# Patient Record
Sex: FEMALE | Race: AMERICAN INDIAN OR ALASKA NATIVE
[De-identification: names, ages, dates, MRNs, and addresses within clinical notes are randomized per-mention and may not be internally consistent; named-entity substitution may affect disease eponyms.]

---

## 2019-11-02 ENCOUNTER — HOSPITAL ENCOUNTER (EMERGENCY)
Dept: HOSPITAL 5 - ED | Age: 24
LOS: 1 days | Discharge: HOME | End: 2019-11-03
Payer: SELF-PAY

## 2019-11-02 DIAGNOSIS — J45.909: ICD-10-CM

## 2019-11-02 DIAGNOSIS — N34.2: Primary | ICD-10-CM

## 2019-11-02 DIAGNOSIS — Z79.899: ICD-10-CM

## 2019-11-02 LAB
BACTERIA #/AREA URNS HPF: (no result) /HPF
BILIRUB UR QL STRIP: (no result)
BLOOD UR QL VISUAL: (no result)
MUCOUS THREADS #/AREA URNS HPF: (no result) /HPF
PH UR STRIP: 6 [PH] (ref 5–7)
PROT UR STRIP-MCNC: (no result) MG/DL
RBC #/AREA URNS HPF: 2 /HPF (ref 0–6)
UROBILINOGEN UR-MCNC: 2 MG/DL (ref ?–2)
WBC #/AREA URNS HPF: 1 /HPF (ref 0–6)

## 2019-11-02 PROCEDURE — 99284 EMERGENCY DEPT VISIT MOD MDM: CPT

## 2019-11-02 PROCEDURE — 87210 SMEAR WET MOUNT SALINE/INK: CPT

## 2019-11-02 PROCEDURE — 87591 N.GONORRHOEAE DNA AMP PROB: CPT

## 2019-11-02 PROCEDURE — 81025 URINE PREGNANCY TEST: CPT

## 2019-11-02 PROCEDURE — 81001 URINALYSIS AUTO W/SCOPE: CPT

## 2019-11-02 PROCEDURE — 96372 THER/PROPH/DIAG INJ SC/IM: CPT

## 2019-11-02 NOTE — EVENT NOTE
ED Screening Note


Date of service: 19


Time: 19:55


ED Screening Note: 





This is a 24 y.o. F. that presents to the ER with vaginal discharge, pruritus, 

and dysuria for 1 week.





She tried monistat with worsening symptoms.





LMP 10/15/2019,  A1 





This initial assessment/diagnostic orders/clinical plan/treatment(s) is/are 

subject to change based on patients health status, clinical progression and re-

assessment by fellow clinical providers in the ED. Further treatment and workup 

at subsequent clinical providers discretion. Patient/guardian urged not to elope

from the ED as their condition may be serious if not clinically assessed and 

managed. 





Initial orders include: 





Pelvic exam


Labs

## 2019-11-02 NOTE — EMERGENCY DEPARTMENT REPORT
HPI





- General


Chief Complaint: Urogenital-Female


Time Seen by Provider: 11/02/19 19:55





- HPI


HPI: 





room 41








The pt is a 23 y/o F p/w a cc of vaginal irritation. The pt states she has had 

vaginal irritation and dysuria x 1 week. Pt c/o burning sensation when she 

urinates. Pt has noticed sl blood on the tissue when she wipes after urinating. 

Pt admits to white vaginal d/c. Pt denies h/o fever. Pt has used OTC vaginal 

creams for yeast but it has not helped. 





ED Past Medical Hx





- Past Medical History


Previous Medical History?: Yes


Hx Asthma: Yes





- Surgical History


Past Surgical History?: No





- Family History


Family history: no significant





- Social History


Smoking Status: Never Smoker


Substance Use Type: Marijuana





- Medications


Home Medications: 


                                Home Medications











 Medication  Instructions  Recorded  Confirmed  Last Taken  Type


 


Ciprofloxacin HCl [Ciprofloxacin 500 mg PO Q12HR #14 tab 11/03/19  Unknown Rx





TAB]     


 


Phenazopyridine [Pyridium] 200 mg PO TID #6 tab 11/03/19  Unknown Rx














ED Review of Systems


ROS: 


Stated complaint: VAG PAIN/IRRITATION


Other details as noted in HPI





Constitutional: denies: fever


Eyes: denies: eye pain


ENT: denies: throat pain


Respiratory: no symptoms reported


Cardiovascular: denies: chest pain


Endocrine: no symptoms reported


Gastrointestinal: denies: abdominal pain


Genitourinary: dysuria, discharge


Musculoskeletal: denies: back pain


Neurological: denies: headache





Physical Exam





- Physical Exam


Vital Signs: 


                                   Vital Signs











  11/02/19





  19:35


 


Temperature 98.7 F


 


Pulse Rate 87


 


Respiratory 18





Rate 


 


Blood Pressure 104/61


 


O2 Sat by Pulse 97





Oximetry 











Physical Exam: 





GEN: WD WN female lying on stretcher in NAD


HEENT: NCAT, EOMI


NECK:Trachea midline, no stridor


Pulm: no resp distress


ABD: there is no distention


Neuro: GCS 15


SKIN: no diaphoresis


MS: no evidence of acute injury


Pelvic: scant white d/c present








ED Course


                                   Vital Signs











  11/02/19





  19:35


 


Temperature 98.7 F


 


Pulse Rate 87


 


Respiratory 18





Rate 


 


Blood Pressure 104/61


 


O2 Sat by Pulse 97





Oximetry 














ED Medical Decision Making





- Lab Data





                                Laboratory Tests











  11/02/19





  21:09


 


Urine Color  Yellow


 


Urine Turbidity  Clear


 


Urine pH  6.0


 


Ur Specific Gravity  1.025


 


Urine Protein  <15 mg/dl


 


Urine Glucose (UA)  Neg


 


Urine Ketones  Neg


 


Urine Blood  Neg


 


Urine Nitrite  Neg


 


Ur Reducing Substances  Not Reportable


 


Urine Bilirubin  Neg


 


Urine Ictotest  Not Reportable


 


Urine Urobilinogen  2.0


 


Ur Leukocyte Esterase  Neg


 


Urine WBC (Auto)  1.0


 


Urine RBC (Auto)  2.0


 


U Epithel Cells (Auto)  4.0


 


Urine Bacteria (Auto)  1+


 


Urine Mucus  1+


 


Urine HCG, Qual  Negative














wet prep- no yeast, trich or clue cells





- Differential Diagnosis


uti, urethritis, bacterial vaginosis


Critical care attestation.: 


If time is entered above; I have spent that time in minutes in the direct care 

of this critically ill patient, excluding procedure time.








ED Disposition


Clinical Impression: 


 Urethritis, Vaginal discharge





Disposition: DC-01 TO HOME OR SELFCARE


Is pt being admited?: No


Does the pt Need Aspirin: No


Condition: Stable


Prescriptions: 


Ciprofloxacin HCl [Ciprofloxacin TAB] 500 mg PO Q12HR #14 tab


Phenazopyridine [Pyridium] 200 mg PO TID #6 tab


Referrals: 


[facility] [Outside] - 3-5 Days


Forms:  [facility] Treatment and Prevention


Time of Disposition: 00:35

## 2019-11-03 VITALS — DIASTOLIC BLOOD PRESSURE: 60 MMHG | SYSTOLIC BLOOD PRESSURE: 110 MMHG

## 2021-01-28 ENCOUNTER — HOSPITAL ENCOUNTER (EMERGENCY)
Dept: HOSPITAL 5 - ED | Age: 26
Discharge: HOME | End: 2021-01-28
Payer: SELF-PAY

## 2021-01-28 VITALS — SYSTOLIC BLOOD PRESSURE: 112 MMHG | DIASTOLIC BLOOD PRESSURE: 76 MMHG
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
## 2021-01-28 DIAGNOSIS — F12.10: ICD-10-CM
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
## 2021-01-28 DIAGNOSIS — M62.838: ICD-10-CM

## 2021-01-28 DIAGNOSIS — Z79.899: ICD-10-CM

## 2021-01-28 DIAGNOSIS — S39.012A: Primary | ICD-10-CM
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
## 2021-01-28 DIAGNOSIS — J45.909: ICD-10-CM
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
## 2021-01-28 LAB
BILIRUB UR QL STRIP: (no result)
BLOOD UR QL VISUAL: (no result)
MUCOUS THREADS #/AREA URNS HPF: (no result) /HPF
PH UR STRIP: 6 [PH] (ref 5–7)
PROT UR STRIP-MCNC: (no result) MG/DL
RBC #/AREA URNS HPF: 1 /HPF (ref 0–6)
UROBILINOGEN UR-MCNC: < 2 MG/DL (ref ?–2)
WBC #/AREA URNS HPF: 2 /HPF (ref 0–6)

## 2021-01-28 PROCEDURE — 87210 SMEAR WET MOUNT SALINE/INK: CPT

## 2021-01-28 PROCEDURE — 87591 N.GONORRHOEAE DNA AMP PROB: CPT

## 2021-01-28 PROCEDURE — 99283 EMERGENCY DEPT VISIT LOW MDM: CPT

## 2021-01-28 PROCEDURE — 81001 URINALYSIS AUTO W/SCOPE: CPT

## 2021-01-28 PROCEDURE — 81025 URINE PREGNANCY TEST: CPT
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
